# Patient Record
Sex: FEMALE | Race: WHITE
[De-identification: names, ages, dates, MRNs, and addresses within clinical notes are randomized per-mention and may not be internally consistent; named-entity substitution may affect disease eponyms.]

---

## 2023-01-01 ENCOUNTER — HOSPITAL ENCOUNTER (EMERGENCY)
Dept: HOSPITAL 97 - ER | Age: 38
Discharge: HOME | End: 2023-01-01
Payer: COMMERCIAL

## 2023-01-01 VITALS — SYSTOLIC BLOOD PRESSURE: 97 MMHG | DIASTOLIC BLOOD PRESSURE: 67 MMHG

## 2023-01-01 VITALS — OXYGEN SATURATION: 100 % | TEMPERATURE: 98.4 F

## 2023-01-01 DIAGNOSIS — Z88.6: ICD-10-CM

## 2023-01-01 DIAGNOSIS — S61.214A: Primary | ICD-10-CM

## 2023-01-01 DIAGNOSIS — Z88.1: ICD-10-CM

## 2023-01-01 PROCEDURE — 0HQFXZZ REPAIR RIGHT HAND SKIN, EXTERNAL APPROACH: ICD-10-PCS

## 2023-01-01 PROCEDURE — 99283 EMERGENCY DEPT VISIT LOW MDM: CPT

## 2023-01-01 NOTE — XMS REPORT
Continuity of Care Document

                           Created on:2023



Patient:HERIBERTO MORROW

Sex:Female

:1985

External Reference #:[id number]





Demographics







                          Address                   [address] [place] [address]

 

                          Home Phone                [phone]

 

                          Mobile Phone              [phone]

 

                          Email Address             [email or web address]

 

                          Preferred Language        English

 

                          Marital Status            Unknown

 

                          Church Affiliation     Unknown

 

                          Race                      Unknown

 

                          Additional Race(s)        White



                                                    Unavailable

 

                          Ethnic Group              Unknown









Author







                          Organization              [facility]

t

 

                          Address                   [address]



                                                    [place] [address]

 

                          Phone                     [phone]









Support







                Name            Relationship    Address         Phone

 

                KRISHAN MORROW               UNKNOWN         [phone]



                                                [place] [address] 









Care Team Providers







                    Name                Role                Phone

 

                    DEISI NEVES     Primary Care Physician Unavailable

 

                    Adriana Attending Clinician Unavailable

 

                    Parul Cannon Attending Clinician [phone]

 

                    Jarod Ashraf MD  Attending Clinician [phone]

 

                    Shanique Jimenes  Attending Clinician [phone]

 

                    SHANIQUE BRONSON      Attending Clinician Unavailable

 

                    HUY_Marcus Admitting Clinician Unavailable

 

                    SHANIQUE BRONSON      Admitting Clinician Unavailable









Payers







           Payer Name Policy Type Policy Number Effective Date Expiration Date STANLEY mendoza

 

           [place]            [id number] 2017 00:00:00         

   



           [place]                                               







Problems







       Condition Condition Condition Status Onset  Resolution Last   Treating Co

mments 

Source



       Name   Details Category        Date   Date   Treatment Clinician        



                                                 Date                 

 

       First  First  Disease Active                       Overview: Univer

s



       degree degree               1-19                        Formatt[facility] ity of



       perineal perineal               00:00:                      g of this Saul

as



       laceration laceration               00                          note   Me

dical



       during during                                           might be Branch



       delivery delivery                                           different 



                                                               from the 



                                                               original. 



                                                               ICD10  



                                                               Diagnosis 



                                                               Term   



                                                               Replacer 



                                                               Utility 

 

       Female Female Disease Active                             Overview: Univer

s



       genital genital                                           Formattin ity o

f



       symptoms symptoms                                           g of this Saul

as



                                                               note   Medical



                                                               might be Branch



                                                               different 



                                                               from the 



                                                               original. 



                                                               ICD10  



                                                               Diagnosis 



                                                               Term   



                                                               Replacer 



                                                               Utility 

 

       Supervisio Supervisio Disease Active                                    U

nivers



       n of other n of other                                                  it

y of



       normal normal                                                  [place]



       pregnancy pregnancy                                                  Medi

harika



                                                                      Branch

 

       Other  Other  Disease Active                                    Univers



       malaise malaise                                                  ity of



       and    and                                                     [place]



       fatigue fatigue                                                  Medical



                                                                      Branch

 

       Need for Need for Disease Active                                    Unive

rs



       prophylact prophylact                                                  it

y of



       ic     ic                                                      [place]



       vaccinatio vaccinatio                                                  Me

dical



       n and  n and                                                   Branch



       inoculatio inoculatio                                                  



       n against n against                                                  



       influenza influenza                                                  

 

       Edema or Edema or Disease Active                                    Unive

rs



       excessive excessive                                                  ity 

of



       weight weight                                                  [place]



       gain,  gain,                                                   Medical



       antepartum antepartum                                                  Br

anch

 

       Contact Contact Disease Active                                    Univers



       dermatitis dermatitis                                                  it

y of



       and other and other                                                  [place]

s



       eczema due eczema due                                                  Me

dical



       to other to other                                                  Branch



       specified specified                                                  



       agent  agent                                                   

 

       Cervical Cervical Disease Active                                    Unive

rs



       high risk high risk                                                  ity 

of



       human  human                                                   [place]



       papillomav papillomav                                                  Me

dical



       irus (HPV) irus (HPV)                                                  Br

anch



       DNA test DNA test                                                  



       positive positive                                                  







Allergies, Adverse Reactions, Alerts







       Allergy Allergy Status Severity Reaction(s) Onset  Inactive Treating Comm

ents 

Source



       Name   Type                        Date   Date   Clinician        

 

       Tetrahyd Propensi Active        Other - See 2016                      U

nivers



       rozoline ty to                comments 0-04                        ity of



       Hcl    adverse                      00:00:                      [place]



              reaction                      00                          Medical



              s                                                       Branch

 

       TETRAHYD DRUG   Active        Other-Cmnt 2016                      Univ

ers



       ROZOLINE INGREDI                      0-04                        ity of



       HCL                                00:00:                      [place]



                                          00                          Medical



                                                                      Branch







Social History







           Social Habit Start Date Stop Date  Quantity   Comments   Source

 

           Exposure to                       Not sure              [facility]



           SARS-CoV-2                                             [facility] [place]



           (event)                                                Branch

 

           Alcohol intake 2021-10-27 2021-10-27 [facility]



                      00:00:00   00:00:00   non-drinker of            [place] Medi

harika



                                            alcohol               Branch



                                            (finding)             

 

           Sex Assigned At 1985                       Universit

y of



           Birth      00:00:00   00:00:00                         [facility]









                Smoking Status  Start Date      Stop Date       Source

 

                Never smoker                                    [facility]







Medications







       Ordered Filled Start  Stop   Current Ordering Indication Dosage Frequency

 Signature

                    Comments            Components          Source



     Medication Medication Date Date Medication? Clinician                (SIG) 

          



     Name Name                                                   

 

     acetaminoph      2021- No             1000mg      1,000 mg,         

  Univers



     en        0-27 10-27                          Oral,           ity of



     (TYLENOL)      22:00: 20:59                          ONCE, 1           [place]

s



     tablet      00   :00                           dose, On           Medical



     1,000 mg                                         Wed            Branch



                                                  10/27/21           



                                                  at 1700,           



                                                  Routine           

 

     iopamidol      2021- No        [id number] 100mL      100 mL,          

 Univers



     (ISOVUE      0-27 10-27                          Intravenou           ity o

f



     370-500 mL)      19:25: 19:26                          s, ONCE, 1          

 [place]



     injection      00   :00                           dose, On           Medica

l



     100 mL                                         Wed            Branch



                                                  10/27/21           



                                                  at 1445,           



                                                  Routine           

 

     oseltamivir            Yes       [id number] 75mg      Take 1          

 Univers



     (TAMIFLU)      1-09                               capsule by           ity 

of



     75 mg      00:00:                               mouth           [place]



     capsule      00                                 daily.           Medical



                                                                 Branch

 

     VALACYCLOVI      2018      Yes                      TAKE ONE           Un

emanuel



     R 1 gram      2-10                               (1)            ity of



     tablet      00:00:                               TABLET(S)           [place]



               00                                 BY MOUTH           Medical



                                                  TWICE A           Branch



                                                  DAY.           

 

     lisdexamfet            Yes       [id number] 20mg      Take 1          

 Univers



     amine      4-04                               capsule by           ity of



     (VYVANSE)      00:00:                               mouth           [place]



     20 mg      00                                 every           Medical



     capsule                                         morning.           Branch

 

     valACYclovi            Yes       [id number] 500mg      Take 1           

Univers



     r 500 mg      3-09                               tablet by           ity of



     tablet      00:00:                               mouth 2           [place]



               00                                 (two)           Medical



                                                  times           Branch



                                                  daily.           

 

     valacyclovi valacyclovi           No                       valacyclov      

     [facility]



     r 500 mg r 500 mg                                    ir 500 mg           Me

dical



     tablet tablet                                    tablet           







Immunizations







           Ordered    Filled Immunization Date       Status     Comments   [facility]

e



           Immunization Name Name                                        

 

           Influenza Virus            2007-10-30 Completed             Universit

y of



           Vaccine               00:00:00                         [facility]







Vital Signs







             Vital Name   Observation Time Observation Value Comments     Source

 

             BP Diastolic 2022 00:00:00 76 mm[Hg]                 [facility] 

edical

 

             Height       2022 00:00:00 65 [in_i]                 [facility]

 

             BMI (Body Mass 2022 00:00:00 23.1 kg/m2                [facility]



             Index)                                              

 

             BP Systolic  2022 00:00:00 108 mm[Hg]                [facility] 

ed[facility]

 

             Body Weight  2022 00:00:00 139 [lb_av]               [facility]

edical

 

             Systolic blood 2021-10-27 21:10:00 97 mm[Hg]                 Univer

sity of



             [facility]

 

             Diastolic blood 2021-10-27 21:10:00 65 mm[Hg]                 Unive

rsity of



             [facility]

 

             Heart rate   2021-10-27 21:10:00 57 /min                   Universi

ty of



                                                                 [facility]

 

             Respiratory rate 2021-10-27 21:10:00 16 /min                   [facility]

 

             Oxygen saturation in 2021-10-27 21:10:00 100 /min                  

[facility]



             Arterial blood by                                        [place] [facility]

harika



             Pulse oximetry                                        Branch

 

             Body temperature 2021-10-27 15:52:00 36.39 Yvrose                 [facility]

 

             Body height  2021-10-27 15:52:00 165.1 cm                  [facility]

 

             Body weight  2021-10-27 15:52:00 58.968 kg                 [facility]

 

             BMI          2021-10-27 15:52:00 21.63 kg/m2               [facility]







Procedures







                Procedure       Date / Time Performed Performing Clinician [facility]

e

 

                CT CHEST PULMONARY 2021-10-27 19:30:51 Shanique Bronson  [facility]



                ANGIOGRAM                                       Medical [place]

 

                XR CHEST 1 VW   2021-10-27 17:27:23 Aiyana [facility]

 

                LIPASE          2021-10-27 16:10:00 Andriy Jarod [facility]

 

                TROPONIN I      2021-10-27 16:10:00 Aiyana [facility]

 

                COMP. METABOLIC PANEL 2021-10-27 16:10:00 Jarod Ashraf [facility]



                ([id number])                                         [facility]

 

                CBC WITH DIFF   2021-10-27 16:10:00 Andriy Jarod [facility]

 

                URINALYSIS      2021-10-27 16:10:00 Jarod Ashraf [facility]

 

                POCT PREGNANCY TEST 2021-10-27 16:08:00 Jarod Ashraf [facility]

 

                NOTICE OF PRIVACY 2021-10-27 15:48:28 Doctor Unassigned, No [facility]



                PRACTICES                       Name            [facility]

 

                CONSENT/REFUSAL FOR 2021-10-27 15:47:52 Doctor Unassigned, No [facility]



                DIAGNOSIS AND                   Name            [facility]



                TREATMENT                                       







Plan of Care







             Planned Activity Planned Date Details      Comments     Source

 

             Diagnostic Test Pending 2022 00:00:00 Ox-eye thai IgE Ab    

          [facility]



                                       [Units/volume] in              



                                       Serum [code =              



                                       6196-0]                   

 

             Diagnostic Test Pending 2022 00:00:00 lh + FSH, serum        

      [facility]



                                       [code = lh + FSH,              



                                       serum]                    

 

             Diagnostic Test Pending 2022 00:00:00 testosterone, free     

         [facility]



                                       + total, serum              



                                       [code =                   



                                       testosterone, free              



                                       + total, serum]              

 

             Diagnostic Test Pending 2022 00:00:00 estradiol, serum       

       [facility]



                                       [code = estradiol,              



                                       serum]                    

 

             Diagnostic Test Pending 2022 00:00:00 progesterone, free,    

          [facility]



                                       serum [code =              



                                       progesterone, free,              



                                       serum]                    

 

             Diagnostic Test Pending 2022 00:00:00 TSH + free T4,         

     [facility]



                                       serum [code = TSH +              



                                       free T4, serum]              

 

             Future Appointment 2023 00:00:00 Parul                    [facility], [address]              



                                       [facility];              



                                       [address],               



                                       [place], [place]               



                                       [address]                







Encounters







        Start   End     Encounter Admission Attending Care    Care    Encounter 

Source



        Date/Time Date/Time Type    Type    Clinicians Facility Department ID   

   

 

        2022 Outpatient         [facility] PRIV    PRIV    [id number]

0802-20 [facility]



        11:21:00 11:21:00                 Tim                 [id number]  Medi

harika

 

        2022 Parul                  PRIV    [facility] 

[facility]



        00:00:00 00:00:00 [facility]

dical



                        ter: [address]                         _[facility]_         



                        [facility],                         Office*         



                        [address]                                           



                        [address],                                           



                        [place], [place]                                              



                        [address]                                         



                        , Ph.                                           



                        [phone]                                         

 

        2022 Outpatient         [facility] PRIV    PRIV    [id number]

[id number] 



        00:00:00 00:00:00                 er, Parul                 [id number] 



                                        L                       [id number] 



                                                                [id number]  

 

        2022 Outpatient         [facility]_ PRIV    PRIV    [id number]

0802-20 [facility]



        10:45:00 10:45:00                 Tim                 [id number]  Medi

harika

 

        2022 Outpatient         [facility] PRIV    PRIV    [id number]

0802-20 [facility]



        10:37:00 10:37:00                 Tim                 [id number]  Medi

harika

 

        2021-10-27 2021-10-27 Emergency         Jarod Ashraf [facility]    [id number]

[id number] 

Univers



        10:56:00 16:14:00                 Shanique Bronson [place] [id number]   

      [facility] [id number]         [facility]  [phone]         Medi

hairka



                                                        [address]             Branch

 

        2021-10-27 2021-10-27 Emergency X       AIYANA  [facility]    ERT     [id number]

90 Univers



        10:56:00 16:14:00                 SHANIQUE                           [facility]







Results







           Test Description Test Time  Test Comments Results    Result Comments 

Source









                    TROPONIN I          2021-10-27 20:05:20 









                      Test Item  Value      Reference Range Interpretation Comme

nts









             TROPONIN I (test code = <0.012       See_Comment                [Au

tomated message] The



             [id number])                                         system which ge

nerated



                                                                 this result tra

nsmitted



                                                                 reference range

: <=0.034



                                                                 ng/mL. The refe

rence



                                                                 range was not u

sed to



                                                                 interpret this 

result as



                                                                 normal/abnormal

.

 

             SHYANN (test code = SHYANN) Reference (Normal)                           



                          Range (defined by the                           



                          99th percentile                           



                          reference limit): <=                           



                          0.034 ng/mL Note:                           



                          Cardiac troponin begins                           



                          to rise 3-4 hours after                           



                          the onset of ischemia.                           



                          Repeat in 4-6 hours if                           



                          the sample was drawn                           



                          within 3-4 hours of the                           



                          onset of the symptom                           



                          and found normal.                           



                          Diagnosis of myocardial                           



                          injury is made with                           



                          acute changes in cTn                           



                          concentrations with at                           



                          least one serial sample                           



                          above the 99th                           



                          percentile upper                           



                          reference limit (URL),                           



                          taken together with the                           



                          patient's clinical                           



                          presentation. Biotin                           



                          has been reported to                           



                          cause a negative bias,                           



                          interpret results                           



                          relative to patient's                           



                          use of biotin.                           

 

             Lab Interpretation Normal                                 



             (test code = 63641-3)                                        



[facility] with Differential2021-10-27 17:31:18





             Test Item    Value        Reference Range Interpretation Comments

 

             WBC (test code =              See_Comment                [Automated



             5632-2)                                             message] The sy

stem



                                                                 which generated



                                                                 this result



                                                                 transmitted



                                                                 reference range

:



                                                                 4.30 - 11.10



                                                                 10*3/?L. The



                                                                 reference range

 was



                                                                 not used to



                                                                 interpret this



                                                                 result as



                                                                 normal/abnormal

.

 

             RBC (test code =              See_Comment                [Automated



             764-8)                                              message] The sy

stem



                                                                 which generated



                                                                 this result



                                                                 transmitted



                                                                 reference range

:



                                                                 3.93 - 5.25



                                                                 10*6/?L. The



                                                                 reference range

 was



                                                                 not used to



                                                                 interpret this



                                                                 result as



                                                                 normal/abnormal

.

 

             HGB (test code = 13.2 g/dL    11.6-15.0                 



             718-7)                                              

 

             HCT (test code = 40.0 %       35.7-45.2                 



             4544-3)                                             

 

             MCV (test code = 95.9 fL      80.6-95.5    H            



             787-2)                                              

 

             MCH (test code = 31.7 pg      25.9-32.8                 



             785-6)                                              

 

             MCHC (test code = 33.0 g/dL    31.6-35.1                 



             786-4)                                              

 

             RDW-SD (test code = 42.3 fL      39.0-49.9                 



             13482-0)                                            

 

             RDW-CV (test code = 12.0 %       12.0-15.5                 



             788-0)                                              

 

             PLT (test code =              See_Comment                [Automated



             777-3)                                              message] The sy

stem



                                                                 which generated



                                                                 this result



                                                                 transmitted



                                                                 reference range

:



                                                                 166 - 358 10*3/

?L.



                                                                 The reference r

lizbeth



                                                                 was not used to



                                                                 interpret this



                                                                 result as



                                                                 normal/abnormal

.

 

             MPV (test code = 9.9 fL       9.5-12.9                  



             58117-2)                                            

 

             NRBC/100 WBC (test              See_Comment                [Automat

ed



             code = 1713208025)                                        message] 

The system



                                                                 which generated



                                                                 this result



                                                                 transmitted



                                                                 reference range

:



                                                                 0.0 - 10.0 /100



                                                                 WBCs. The refer

ence



                                                                 range was not u

sed



                                                                 to interpret th

is



                                                                 result as



                                                                 normal/abnormal

.

 

             NRBC x10^3 (test code <0.01        See_Comment                [Auto

mated



             = [id number])                                        message] The s

ystem



                                                                 which generated



                                                                 this result



                                                                 transmitted



                                                                 reference range

:



                                                                 10*3/?L. The



                                                                 reference range

 was



                                                                 not used to



                                                                 interpret this



                                                                 result as



                                                                 normal/abnormal

.

 

             GRAN MAT (NEUT) % 41.7 %                                 



             (test code = 770-8)                                        

 

             IMM GRAN % (test code 0.00 %                                 



             = [id number])                                        

 

             LYMPH % (test code = 46.5 %                                 



             736-9)                                              

 

             MONO % (test code = 7.3 %                                  



             5905-5)                                             

 

             EOS % (test code = 3.9 %                                  



             713-8)                                              

 

             BASO % (test code = 0.6 %                                  



             706-2)                                              

 

             GRAN MAT x10^3(ANC) 1.94 10*3/uL 1.88-7.09                 



             (test code =                                        



             6690768275)                                         

 

             IMM GRAN x10^3 (test <0.03        0.00-0.06                 



             code = 1956369150)                                        

 

             LYMPH x10^3 (test code 2.16 10*3/uL 1.32-3.29                 



             = 731-0)                                            

 

             MONO x10^3 (test code 0.34 10*3/uL 0.33-0.92                 



             = 742-7)                                            

 

             EOS x10^3 (test code = 0.18 10*3/uL 0.03-0.39                 



             711-2)                                              

 

             BASO x10^3 (test code 0.03 10*3/uL 0.01-0.07                 



             = 704-7)                                            

 

             Lab Interpretation Abnormal                               



             (test code = 39334-7)                                        



[facility]





             Test Item    Value        Reference Range Interpretation Comments

 

             NA (test code = 136 mmol/L   135-145                   



             0596150464)                                         

 

             K (test code = 4.2 mmol/L   3.5-5.0                   



             9260300093)                                         

 

             CL (test code = 105 mmol/L                       



             6914481493)                                         

 

             CO2 TOTAL (test code 26 mmol/L    23-31                     



             = 9697081178)                                        

 

             AGAP (test code =              2-16                      



             3375453987)                                         

 

             BUN (test code = 12 mg/dL     7-23                      



             2980200748)                                         

 

             GLUCOSE (test code = 87 mg/dL                         



             3974693237)                                         

 

             CREATININE (test code 0.65 mg/dL   0.50-1.04                 



             = 3018616997)                                        

 

             TOTAL BILI (test code 0.8 mg/dL    0.1-1.1                   



             = 6167302517)                                        

 

             CALCIUM (test code = 9.2 mg/dL    8.6-10.6                  



             2243826453)                                         

 

             T PROTEIN (test code 7.2 g/dL     6.3-8.2                   



             = 8666900538)                                        

 

             ALBUMIN (test code = 4.4 g/dL     3.5-5.0                   



             3143736465)                                         

 

             ALK PHOS (test code = 35 U/L                           



             6967482391)                                         

 

             ALTv (test code = 19 U/L       5-35                      



             1742-6)                                             

 

             AST(SGOT) (test code 24 U/L       13-40                     



             = 7284164948)                                        

 

             eGFR (test code =              mL/min/1.73m2              



             8039896927)                                         

 

             SHYANN (test code = SHYANN) Association of                           



                          Glomerular Filtration                           



                          Rate (GFR) and Staging                           



                          of Kidney Disease*                           



                          +-----------------------                           



                          +---------------------+-                           



                          ------------------------                           



                          +| GFR (mL/min/1.73 m2)                           



                          ?| With Kidney Damage ?|                           



                          ?Without Kidney                           



                          Damage+-----------------                           



                          ------+-----------------                           



                          ----+-------------------                           



                          ------+| ?>90 ? ? ? ? ?                           



                          ? ? ? ?| ?Stage one ? ?                           



                          ? ? ?| ? Normal ? ? ? ?                           



                          ? ? ?                                  



                          ?+----------------------                           



                          -+---------------------+                           



                          ------------------------                           



                          -+| ?60-89 ? ? ? ? ? ? ?                           



                          ?| ?Stage two ? ? ? ? ?|                           



                          ? Decreased GFR ? ? ? ?                           



                          +-----------------------                           



                          +---------------------+-                           



                          ------------------------                           



                          +| ?30-59 ? ? ? ? ? ? ?                           



                          ?| ?Stage three ? ? ? ?|                           



                          ? Stage three ? ? ? ? ?                           



                          +-----------------------                           



                          +---------------------+-                           



                          ------------------------                           



                          +| ?15-29 ? ? ? ? ? ? ?                           



                          ?| ?Stage four ? ? ? ? |                           



                          ? Stage four ? ? ? ? ?                           



                          ?+----------------------                           



                          -+---------------------+                           



                          ------------------------                           



                          -+| ?<15 (or dialysis) ?                           



                          ?| ?Stage five ? ? ? ? |                           



                          ? Stage five ? ? ? ? ?                           



                          ?+----------------------                           



                          -+---------------------+                           



                          ------------------------                           



                          -+ *Each stage assumes                           



                          the associated GFR level                           



                          has been in effect for                           



                          at least three months.                           



                          ?Stages 1 to 5, with or                           



                          without kidney disease,                           



                          indicate chronic kidney                           



                          disease. Notes:                           



                          Determination of stages                           



                          one and two (with eGFR                           



                          >59mL/min/1.73 m2)                           



                          requires estimation of                           



                          kidney damage for at                           



                          least three months as                           



                          defined by structural or                           



                          functional abnormalities                           



                          of the kidney,                           



                          manifested by                           



                          either:Pathological                           



                          abnormalities or Markers                           



                          of kidney damage                           



                          (including abnormalities                           



                          in the composition of                           



                          the blood or urine or                           



                          abnormalities in imaging                           



                          tests).                                



[facility]40





             Test Item    Value        Reference Range Interpretation Comments

 

             LIPASE (test code = 7882027507) 168 U/L      0-220                 

    

 

             Lab Interpretation (test code = Normal                             

    



             64423-5)                                            



[facility]:00





             Test Item    Value        Reference Range Interpretation Comments

 

             POCT PREG (test code = 1605) negative                              

 

 

             On board controls acceptable with present                          

      



             C Line (test code = 3574)                                        

 

             POCT PREG LOT # (test code = 3575) [id number]                      

       

 

             POCT PREG TEST  DATE (test 2022                        

     



             code = 3576)                                        

 

             Lab Interpretation (test code = Normal                             

    



             73998-4)                                            



[facility]

## 2023-01-01 NOTE — EDPHYS
Physician Documentation                                                                           

 [facility]                                                                 

Name: Jesenia Sellers                                                                                

Age: 37 yrs                                                                                       

Sex: Female                                                                                       

: 1985                                                                                   

MRN: [id number]                                                                                   

Arrival Date: 2023                                                                          

Time: 08:43                                                                                       

Account#: [id number]                                                                            

Bed 16                                                                                            

Private MD:                                                                                       

ED Physician Brian York                                                                         

HPI:                                                                                              

                                                                                             

09:18 This 37 yrs old Female presents to ER via Ambulatory with complaints of Finger Injury.  en  

09:18 36 yo RHF F presents to ED with right ring finger laceration PTA on wine glass. Tried   en  

      to catch it from falling off counter when it broke. DROM at DIP, bleeding controlled.       

      Unk last Td, but declining. No numbness.                                                    

                                                                                                  

OB/GYN:                                                                                           

09:03 LMP 12/3/2022                                                                           vg1 

                                                                                                  

Historical:                                                                                       

- Allergies:                                                                                      

09:03 Augmentin;                                                                              vg1 

09:15 Ibuprofen;                                                                              vg1 

- Home Meds:                                                                                      

09:03 citalopram oral [Active];                                                               vg1 

- PMHx:                                                                                           

09:03 Anxiety;                                                                                vg1 

                                                                                                  

- Immunization history:: Client reports having NOT received the Covid vaccine. Last               

  tetanus immunization: unknown.                                                                  

- Social history:: Smoking status: Patient denies any tobacco usage or history of.                

                                                                                                  

                                                                                                  

ROS:                                                                                              

09:18 Constitutional: Negative for fever, chills, and weight loss.                            en  

09:18 MS/extremity: Positive for Lac to ring finger with DROM at DIP. .                           

09:18 All other systems are negative.                                                             

                                                                                                  

Exam:                                                                                             

09:18 Constitutional:  This is a well developed, well nourished patient who is awake, alert,  en  

      and in no acute distress.                                                                   

09:18 Musculoskeletal/extremity: right ring finger with 2.5cm lac over volar surface of           

      middle phalanx with DROM in Flexion at DIP. Cap refill < 2sec. Suspect tendon               

      laceration. Will examine under local anesthesia.                                            

                                                                                                  

Vital Signs:                                                                                      

09:01  / 76; Pulse 60; Resp 15; Temp 98.4; Pulse Ox 100% ; Weight 63.5 kg; Height 5 ft. vg1 

      5 in. (165.10 cm); Pain 2/10;                                                               

10:00 BP 97 / 67; Pulse 70; Resp 14; Pulse Ox 100% ;                                          vg1 

09:01 Body Mass Index 23.30 (63.50 kg, 165.10 cm)                                             vg1 

                                                                                                  

Laceration:                                                                                       

10:30 Wound Repair of 2.5cm ( 1.0in ) subcutaneous laceration to right hand. Distal           en  

      neuro/vascular/tendon intact. Anesthesia: Wound infiltrated with 7 mls of 2% lidocaine.     

      Skin closed with 7 4-0 Ethilon using simple sutures and sterile technique. Dressed with     

      bandaid. Patient tolerated well.                                                            

                                                                                                  

MDM:                                                                                              

08:46 Patient medically screened.                                                             en  

09:18 Data reviewed: vital signs, nurses notes, and as a result, I will primarily close       en  

      laceration. **XR considered but not warranted given mechanism. **Will give po tylenol       

      for pain. .                                                                                 

10:30 ED course: Pt tolerated procedure well. Wound care discussed.                           en  

10:33 ED course: XR considered but wound base well visualized and no the glass was not in     en  

      small pieces. No concern for FB of fracture. Tendon intact. FROM at DIP once                

      anesthetized. NVI.                                                                          

                                                                                                  

Administered Medications:                                                                         

09:25 Drug: Lidocaine (1 %) 10 ml Volume: 20 ml; Route: Infiltration;                         kc6 

10:58 Follow up: Response: No adverse reaction                                                vg1 

10:58 Follow up: administered by provider at 1018                                             [facility] 

09:25 Drug: Tylenol 650 mg Route: PO;                                                         kc6 

10:58 Follow up: Response: No adverse reaction; Marked relief of symptoms                     vg1 

                                                                                                  

                                                                                                  

Disposition:                                                                                      

13:51 Co-signature as Attending Physician, Brian York MD.                                    rn  

                                                                                                  

Disposition Summary:                                                                              

23 10:32                                                                                    

Discharge Ordered                                                                                 

      Location: Home                                                                          en  

      Problem: new                                                                            en  

      Symptoms: have improved                                                                 en  

      Condition: Stable                                                                       en  

      Diagnosis                                                                                   

        - Laceration without foreign body of finger without damage to nail                    en  

      Followup:                                                                               en  

        - With: Private Physician                                                                  

        - When: 7 - 10 days                                                                        

        - Reason: Staple/Suture removal                                                            

      Discharge Instructions:                                                                     

        - Discharge Summary Sheet                                                             en  

        - Laceration Care, Adult, Easy-to-Read                                                en  

      Forms:                                                                                      

        - Medication Reconciliation Form                                                      en  

        - Thank You Letter                                                                    en  

        - Antibiotic Education                                                                en  

        - Prescription Opioid Use                                                             en  

Signatures:                                                                                       

Brian York MD MD rn Garcia, Victoria RN                    RN   peter1                                                  

Emilia Ramon PA PA   en                                                   

Mari Duenas RN                   RN   kc6                                                  

                                                                                                  

Corrections: (The following items were deleted from the chart)                                    

10:34 10:30 ED course: Pt tolerated procedure well. Wound care discussed. en                  en  

                                                                                                  

**************************************************************************************************

## 2023-01-01 NOTE — ER
Nurse's Notes                                                                                     

 [facility]                                                                 

Name: Jesenia Sellers                                                                                

Age: 37 yrs                                                                                       

Sex: Female                                                                                       

: 1985                                                                                   

MRN: [id number]                                                                                   

Arrival Date: 2023                                                                          

Time: 08:43                                                                                       

Account#: [id number]                                                                            

Bed 16                                                                                            

Private MD:                                                                                       

Diagnosis: Laceration without foreign body of finger without damage to nail                       

                                                                                                  

Presentation:                                                                                     

                                                                                             

09:01 Chief complaint: Patient states: about an hour ago pt was reaching out for a glass cup  vg1 

      that was falling and cut Right Ring finger; bleeding controlled; rates pain 2/10.           

      Coronavirus screen: Vaccine status: Patient reports being unvaccinated. Client denies       

      travel out of the [place] in the last 14 days. Ebola Screen: Patient negative for fever        

      greater than or equal to 101.5 degrees Fahrenheit, and additional compatible Ebola          

      Virus Disease symptoms. Initial Sepsis Screen: Does the patient meet any 2 criteria?        

      No. Patient's initial sepsis screen is negative. Does the patient have a suspected          

      source of infection? No. Patient's initial sepsis screen is negative. Risk Assessment:      

      Do you want to hurt yourself or someone else? Patient reports no desire to harm self or     

      others. Onset of symptoms was 2023.                                             

09:01 Method Of Arrival: Ambulatory                                                           vg1 

09:01 Acuity: MONICA 4                                                                           vg1 

                                                                                                  

Triage Assessment:                                                                                

09:03 General: Appears in no apparent distress. comfortable, Behavior is calm, cooperative.   vg1 

      Pain: Complains of pain in palmar aspect of middle phalanx of right ring finger and         

      palmar aspect of proximal phalanx of right ring finger Pain currently is 2 out of 10 on     

      a pain scale. Musculoskeletal: Circulation, motion, and sensation intact. Injury            

      Description: Laceration sustained to palmar aspect of middle phalanx of right ring          

      finger and palmar aspect of proximal phalanx of right ring finger.                          

                                                                                                  

OB/GYN:                                                                                           

09:03 LMP 12/3/2022                                                                           vg1 

                                                                                                  

Historical:                                                                                       

- Allergies:                                                                                      

09:03 Augmentin;                                                                              vg1 

09:15 Ibuprofen;                                                                              vg1 

- Home Meds:                                                                                      

09:03 citalopram oral [Active];                                                               vg1 

- PMHx:                                                                                           

09:03 Anxiety;                                                                                vg1 

                                                                                                  

- Immunization history:: Client reports having NOT received the Covid vaccine. Last               

  tetanus immunization: unknown.                                                                  

- Social history:: Smoking status: Patient denies any tobacco usage or history of.                

                                                                                                  

                                                                                                  

Screenin:14 [facility] ED Fall Risk Assessment (Adult) History of falling in the last 3 months,       vg1 

      including since admission No falls in past 3 months (0 pts) Confusion or Disorientation     

      No (0 pts) Intoxicated or Sedated No (0 pts) Impaired Gait No (0 pts) Mobility Assist       

      Device Used No (0 pt) Altered Elimination No (0 pt) Score/Fall Risk Level 0 - 2 = Low       

      Risk Oriented to surroundings, Maintained a safe environment, Educated pt \T\ family on     

      fall prevention, incl call for assistance when getting out of bed, Assessed \T\             

      reinforced patient's understanding of fall precautions. Abuse screen: Denies threats or     

      abuse. Nutritional screening: No deficits noted. Tuberculosis screening: No symptoms or     

      risk factors identified.                                                                    

                                                                                                  

Assessment:                                                                                       

09:01 Reassessment: SEE TRIAGE.                                                               vg1 

10:17 Reassessment: Patient appears in no apparent distress at this time. No changes from     vg1 

      previously documented assessment. Patient and/or family updated on plan of care and         

      expected duration. Pain level reassessed. Patient is alert, oriented x 3, equal             

      unlabored respirations, skin warm/dry/pink.                                                 

10:18 Reassessment: Provider at bedside.                                                      vg1 

                                                                                                  

Vital Signs:                                                                                      

09:01  / 76; Pulse 60; Resp 15; Temp 98.4; Pulse Ox 100% ; Weight 63.5 kg; Height 5 ft. vg1 

      5 in. (165.10 cm); Pain 2/10;                                                               

10:00 BP 97 / 67; Pulse 70; Resp 14; Pulse Ox 100% ;                                          vg1 

09:01 Body Mass Index 23.30 (63.50 kg, 165.10 cm)                                             vg1 

                                                                                                  

ED Course:                                                                                        

08:43 Patient arrived in ED.                                                                  mr  

08:46 Emilia Ramon PA is PHCP.                                                         en  

08:46 Brian York MD is Attending Physician.                                                en  

09:03 Triage completed.                                                                       vg1 

09:03 Arm band placed on.                                                                     vg1 

09:13 Lucrecia Neely, RN is Primary Nurse.                                                  vg1 

09:14 Patient has correct armband on for positive identification. Bed in low position. Call   vg1 

      light in reach. Adult w/ patient.                                                           

10:57 No provider procedures requiring assistance completed. Patient did not have IV access   vg1 

      during this emergency room visit.                                                           

                                                                                                  

Administered Medications:                                                                         

09:25 Drug: Lidocaine (1 %) 10 ml Volume: 20 ml; Route: Infiltration;                         kc6 

10:58 Follow up: Response: No adverse reaction                                                vg1 

10:58 Follow up: administered by provider at 1018                                             vg1 

09:25 Drug: Tylenol 650 mg Route: PO;                                                         kc6 

10:58 Follow up: Response: No adverse reaction; Marked relief of symptoms                     vg1 

                                                                                                  

                                                                                                  

Medication:                                                                                       

10:57 VIS not applicable for this client.                                                     vg1 

                                                                                                  

Intake:                                                                                           

                                                                                                  

Outcome:                                                                                          

10:32 Discharge ordered by MD.                                                                ginna  

10:57 Discharged to home ambulatory, with family.                                             vg1 

10:57 Condition: good                                                                             

10:57 Discharge instructions given to patient, Instructed on discharge instructions, follow       

      up and referral plans. wound care, Demonstrated understanding of instructions,              

      follow-up care, wound care.                                                                 

10:57 Patient left the ED.                                                                    vg1 

                                                                                                  

Signatures:                                                                                       

Jayde Yu Victoria, RN                    RN   vg1                                                  

Emilia Ramon PA PA en Campbell, Kaitlyn RN                   RN   kc6                                                  

                                                                                                  

**************************************************************************************************